# Patient Record
Sex: MALE | Race: WHITE | NOT HISPANIC OR LATINO | Employment: FULL TIME | URBAN - METROPOLITAN AREA
[De-identification: names, ages, dates, MRNs, and addresses within clinical notes are randomized per-mention and may not be internally consistent; named-entity substitution may affect disease eponyms.]

---

## 2017-12-20 ENCOUNTER — HOSPITAL ENCOUNTER (OUTPATIENT)
Dept: PREADMISSION TESTING | Facility: OTHER | Age: 57
Discharge: HOME | End: 2017-12-20
Attending: ORTHOPAEDIC SURGERY
Payer: COMMERCIAL

## 2017-12-20 ENCOUNTER — ANESTHESIA EVENT (OUTPATIENT)
Dept: SURGERY | Facility: OTHER | Age: 57
DRG: 470 | End: 2017-12-20
Payer: COMMERCIAL

## 2017-12-20 VITALS
HEART RATE: 85 BPM | HEIGHT: 68 IN | SYSTOLIC BLOOD PRESSURE: 170 MMHG | BODY MASS INDEX: 28.79 KG/M2 | RESPIRATION RATE: 16 BRPM | DIASTOLIC BLOOD PRESSURE: 90 MMHG | OXYGEN SATURATION: 98 % | WEIGHT: 190 LBS | TEMPERATURE: 99 F

## 2017-12-20 DIAGNOSIS — Z01.818 PREOPERATIVE TESTING: ICD-10-CM

## 2017-12-20 DIAGNOSIS — M17.12 PRIMARY OSTEOARTHRITIS OF LEFT KNEE: Primary | ICD-10-CM

## 2017-12-20 LAB
ANION GAP SERPL CALC-SCNC: 9 MMOL/L
BILIRUB UR QL STRIP: NEGATIVE
BUN SERPL-MCNC: 18 MG/DL
CALCIUM SERPL-MCNC: 9.8 MG/DL
CHLORIDE SERPL-SCNC: 105 MMOL/L
CLARITY UR: CLEAR
CO2 SERPL-SCNC: 27 MMOL/L
COLOR UR: YELLOW
CREAT SERPL-MCNC: 0.9 MG/DL
EST. GFR  (AFRICAN AMERICAN): >60 ML/MIN/1.73 M^2
EST. GFR  (NON AFRICAN AMERICAN): >60 ML/MIN/1.73 M^2
GLUCOSE SERPL-MCNC: 89 MG/DL
GLUCOSE UR QL STRIP: NEGATIVE
HCT VFR BLD AUTO: 44.8 %
HGB BLD-MCNC: 15.1 G/DL
HGB UR QL STRIP: NEGATIVE
KETONES UR QL STRIP: NEGATIVE
LEUKOCYTE ESTERASE UR QL STRIP: NEGATIVE
NITRITE UR QL STRIP: NEGATIVE
PH UR STRIP: 7 [PH] (ref 5–8)
POTASSIUM SERPL-SCNC: 4.7 MMOL/L
PROT UR QL STRIP: NEGATIVE
SODIUM SERPL-SCNC: 141 MMOL/L
SP GR UR STRIP: 1.01 (ref 1–1.03)
URN SPEC COLLECT METH UR: NORMAL
UROBILINOGEN UR STRIP-ACNC: NEGATIVE EU/DL

## 2017-12-20 PROCEDURE — 86850 RBC ANTIBODY SCREEN: CPT

## 2017-12-20 PROCEDURE — 81003 URINALYSIS AUTO W/O SCOPE: CPT

## 2017-12-20 PROCEDURE — 36415 COLL VENOUS BLD VENIPUNCTURE: CPT

## 2017-12-20 PROCEDURE — 86900 BLOOD TYPING SEROLOGIC ABO: CPT

## 2017-12-20 PROCEDURE — 85018 HEMOGLOBIN: CPT

## 2017-12-20 PROCEDURE — 93010 ELECTROCARDIOGRAM REPORT: CPT | Mod: ,,, | Performed by: INTERNAL MEDICINE

## 2017-12-20 PROCEDURE — 80048 BASIC METABOLIC PNL TOTAL CA: CPT

## 2017-12-20 PROCEDURE — 85014 HEMATOCRIT: CPT

## 2017-12-20 PROCEDURE — 93005 ELECTROCARDIOGRAM TRACING: CPT

## 2017-12-20 RX ORDER — FAMOTIDINE 20 MG/1
20 TABLET, FILM COATED ORAL
Status: CANCELLED | OUTPATIENT
Start: 2017-12-20 | End: 2017-12-20

## 2017-12-20 RX ORDER — MIDAZOLAM HYDROCHLORIDE 5 MG/ML
4 INJECTION INTRAMUSCULAR; INTRAVENOUS
Status: CANCELLED | OUTPATIENT
Start: 2017-12-20

## 2017-12-20 RX ORDER — OXYCODONE HYDROCHLORIDE 5 MG/1
5 TABLET ORAL ONCE AS NEEDED
Status: CANCELLED | OUTPATIENT
Start: 2017-12-20 | End: 2017-12-20

## 2017-12-20 RX ORDER — HYDROCODONE BITARTRATE AND ACETAMINOPHEN 10; 325 MG/1; MG/1
TABLET ORAL EVERY 6 HOURS PRN
COMMUNITY

## 2017-12-20 RX ORDER — DOXEPIN HYDROCHLORIDE 10 MG/ML
SOLUTION ORAL NIGHTLY
COMMUNITY

## 2017-12-20 RX ORDER — CARISOPRODOL 350 MG/1
350 TABLET ORAL 4 TIMES DAILY PRN
COMMUNITY

## 2017-12-20 RX ORDER — SODIUM CHLORIDE, SODIUM LACTATE, POTASSIUM CHLORIDE, CALCIUM CHLORIDE 600; 310; 30; 20 MG/100ML; MG/100ML; MG/100ML; MG/100ML
INJECTION, SOLUTION INTRAVENOUS CONTINUOUS
Status: CANCELLED | OUTPATIENT
Start: 2017-12-20

## 2017-12-20 RX ORDER — HYDROCODONE BITARTRATE 40 MG/1
40 TABLET, EXTENDED RELEASE ORAL
COMMUNITY

## 2017-12-20 RX ORDER — LIDOCAINE HYDROCHLORIDE 10 MG/ML
1 INJECTION, SOLUTION EPIDURAL; INFILTRATION; INTRACAUDAL; PERINEURAL ONCE
Status: CANCELLED | OUTPATIENT
Start: 2017-12-20 | End: 2017-12-20

## 2017-12-20 NOTE — DISCHARGE INSTRUCTIONS
PRE-ADMIT TESTING -  [phone]    [address]  [facility]          Your surgery has been scheduled at [facility]. We are pleased to have the opportunity to serve you. For Further Information please call [phone].    On the day of surgery please report to the Information Desk on the 1st floor.    · CONTACT YOUR PHYSICIAN'S OFFICE THE DAY PRIOR TO YOUR SURGERY TO OBTAIN YOUR ARRIVAL TIME.     · The evening before surgery do not eat anything after 9 p.m. ( this includes hard candy, chewing gum and mints).  You may only have GATORADE, POWERADE AND WATER  from 9 p.m. until you leave your home.   DO NOT DRINK ANY LIQUIDS ON THE WAY TO THE HOSPITAL.      SPECIAL MEDICATION INSTRUCTIONS: TAKE medications checked off by the Anesthesiologist on your Medication List.    Angiogram Patients: Take medications as instructed by your physician, including aspirin.     Surgery Patients:    If you take ASPIRIN - Your PHYSICIAN/SURGEON will need to inform you IF/OR when you need to stop taking aspirin prior to your surgery.     Do Not take any medications containing IBUPROFEN.  Do Not Wear any make-up or dark nail polish   (especially eye make-up) to surgery. If you come to surgery with makeup on you will be required to remove the makeup or nail polish.    Do not shave your surgical area at least 5 days prior to your surgery. The surgical prep will be performed at the hospital according to Infection Control regulations.    Leave all valuables at home.   Do Not wear any jewelry or watches, including any metal in body piercings.  Contact Lens must be removed before surgery. Either do not wear the contact lens or bring a case and solution for storage.  Please bring a container for eyeglasses or dentures as required.  Bring any paperwork your physician has provided, such as consent forms,  history and physicals, doctor's orders, etc.   Bring comfortable clothes that are loose fitting to wear upon  discharge. Take into consideration the type of surgery being performed.  Maintain your diet as advised per your physician the day prior to surgery.      Adequate rest the night before surgery is advised.   Park in the Parking lot behind the hospital or in the [facility] across the street from the parking lot. Parking is complimentary.  If you will be discharged the same day as your procedure, please arrange for a responsible adult to drive you home or to accompany you if traveling by taxi.   YOU WILL NOT BE PERMITTED TO DRIVE OR TO LEAVE THE HOSPITAL ALONE AFTER SURGERY.   It is strongly recommended that you arrange for someone to remain with you for the first 24 hrs following your surgery.       Thank you for your cooperation.  The Staff of [facility].        Bathing Instructions                                                                 Please shower the evening before and morning of your procedure with    ANTIBACTERIAL SOAP. ( DIAL, etc )  Concentrate on the surgical area   for at least 3 minutes and rinse completely. Dry off as usual.   Do not use any deodorant, powder, body lotions, perfume, after shave or    cologne.

## 2017-12-20 NOTE — ANESTHESIA PREPROCEDURE EVALUATION
12/20/2017  Cesar Lomeli is a 57 y.o., male.    Anesthesia Evaluation    I have reviewed the Patient Summary Reports.    I have reviewed the Nursing Notes.   I have reviewed the Medications.     Review of Systems  Anesthesia Hx:  No problems with previous Anesthesia    Social:  Non-Smoker, Social Alcohol Use    Hematology/Oncology:  Hematology Normal   Oncology Normal     EENT/Dental:EENT/Dental Normal   Cardiovascular:  Cardiovascular Normal Exercise tolerance: good     Pulmonary:  Pulmonary Normal    Renal/:  Renal/ Normal     Hepatic/GI:  Hepatic/GI Normal    Musculoskeletal:  Spine Disorders: cervical    Neurological:  Neurology Normal    Endocrine:  Endocrine Normal    Dermatological:  Skin Normal    Psych:  Psychiatric Normal           Physical Exam  General:  Well nourished, Obesity    Airway/Jaw/Neck:  Airway Findings: Mouth Opening: Normal Tongue: Normal  General Airway Assessment: Adult, Average  Mallampati: II  TM Distance: Normal, at least 6 cm  Jaw/Neck Findings:  Neck ROM: Normal ROM      Dental:  Dental Findings: In tact, molar caps        Mental Status:  Mental Status Findings:  Cooperative, Alert and Oriented         Anesthesia Plan  Type of Anesthesia, risks & benefits discussed:  Anesthesia Type:  spinal  Patient's Preference:   Intra-op Monitoring Plan: standard ASA monitors  Intra-op Monitoring Plan Comments:   Post Op Pain Control Plan:   Post Op Pain Control Plan Comments:   Induction:    Beta Blocker:         Informed Consent: Patient understands risks and agrees with Anesthesia plan.  Questions answered. Anesthesia consent signed with patient.  ASA Score: 2     Day of Surgery Review of History & Physical:    H&P update referred to the surgeon.     Anesthesia Plan Notes: Labs and EKG.        Ready For Surgery From Anesthesia Perspective.

## 2017-12-21 LAB
ABO + RH BLD: NORMAL
BLD GP AB SCN CELLS X3 SERPL QL: NORMAL

## 2017-12-26 ENCOUNTER — ANESTHESIA (OUTPATIENT)
Dept: SURGERY | Facility: OTHER | Age: 57
DRG: 470 | End: 2017-12-26
Payer: COMMERCIAL

## 2017-12-26 ENCOUNTER — HOSPITAL ENCOUNTER (INPATIENT)
Facility: OTHER | Age: 57
LOS: 1 days | Discharge: HOME | DRG: 470 | End: 2017-12-27
Attending: ORTHOPAEDIC SURGERY | Admitting: ORTHOPAEDIC SURGERY
Payer: COMMERCIAL

## 2017-12-26 DIAGNOSIS — M17.12 PRIMARY OSTEOARTHRITIS OF LEFT KNEE: Primary | ICD-10-CM

## 2017-12-26 DIAGNOSIS — M17.12 PRIMARY LOCALIZED OSTEOARTHROSIS OF THE KNEE, LEFT: ICD-10-CM

## 2017-12-26 PROCEDURE — 27201423 OPTIME MED/SURG SUP & DEVICES STERILE SUPPLY: Performed by: ORTHOPAEDIC SURGERY

## 2017-12-26 PROCEDURE — 63600175 PHARM REV CODE 636 W HCPCS: Performed by: NURSE ANESTHETIST, CERTIFIED REGISTERED

## 2017-12-26 PROCEDURE — 71000033 HC RECOVERY, INTIAL HOUR: Performed by: ORTHOPAEDIC SURGERY

## 2017-12-26 PROCEDURE — 63600175 PHARM REV CODE 636 W HCPCS: Performed by: SPECIALIST

## 2017-12-26 PROCEDURE — 25000003 PHARM REV CODE 250: Performed by: ANESTHESIOLOGY

## 2017-12-26 PROCEDURE — 36000710: Performed by: ORTHOPAEDIC SURGERY

## 2017-12-26 PROCEDURE — 97161 PT EVAL LOW COMPLEX 20 MIN: CPT

## 2017-12-26 PROCEDURE — 25000003 PHARM REV CODE 250: Performed by: SPECIALIST

## 2017-12-26 PROCEDURE — 63600175 PHARM REV CODE 636 W HCPCS: Performed by: ORTHOPAEDIC SURGERY

## 2017-12-26 PROCEDURE — 97530 THERAPEUTIC ACTIVITIES: CPT

## 2017-12-26 PROCEDURE — 25000003 PHARM REV CODE 250: Performed by: NURSE ANESTHETIST, CERTIFIED REGISTERED

## 2017-12-26 PROCEDURE — 63600175 PHARM REV CODE 636 W HCPCS: Performed by: ANESTHESIOLOGY

## 2017-12-26 PROCEDURE — 37000009 HC ANESTHESIA EA ADD 15 MINS: Performed by: ORTHOPAEDIC SURGERY

## 2017-12-26 PROCEDURE — 97116 GAIT TRAINING THERAPY: CPT

## 2017-12-26 PROCEDURE — C1776 JOINT DEVICE (IMPLANTABLE): HCPCS | Performed by: ORTHOPAEDIC SURGERY

## 2017-12-26 PROCEDURE — 71000039 HC RECOVERY, EACH ADD'L HOUR: Performed by: ORTHOPAEDIC SURGERY

## 2017-12-26 PROCEDURE — 94799 UNLISTED PULMONARY SVC/PX: CPT

## 2017-12-26 PROCEDURE — 11000001 HC ACUTE MED/SURG PRIVATE ROOM

## 2017-12-26 PROCEDURE — 37000008 HC ANESTHESIA 1ST 15 MINUTES: Performed by: ORTHOPAEDIC SURGERY

## 2017-12-26 PROCEDURE — 63600175 PHARM REV CODE 636 W HCPCS

## 2017-12-26 PROCEDURE — 25000003 PHARM REV CODE 250: Performed by: ORTHOPAEDIC SURGERY

## 2017-12-26 PROCEDURE — 36000711: Performed by: ORTHOPAEDIC SURGERY

## 2017-12-26 PROCEDURE — C1713 ANCHOR/SCREW BN/BN,TIS/BN: HCPCS | Performed by: ORTHOPAEDIC SURGERY

## 2017-12-26 PROCEDURE — 0SRD0J9 REPLACEMENT OF LEFT KNEE JOINT WITH SYNTHETIC SUBSTITUTE, CEMENTED, OPEN APPROACH: ICD-10-PCS | Performed by: ORTHOPAEDIC SURGERY

## 2017-12-26 PROCEDURE — 99900035 HC TECH TIME PER 15 MIN (STAT)

## 2017-12-26 PROCEDURE — C9290 INJ, BUPIVACAINE LIPOSOME: HCPCS | Performed by: ORTHOPAEDIC SURGERY

## 2017-12-26 PROCEDURE — 27000221 HC OXYGEN, UP TO 24 HOURS

## 2017-12-26 DEVICE — CEMENT BONE RDPQ 40G PDR 20ML: Type: IMPLANTABLE DEVICE | Site: KNEE | Status: FUNCTIONAL

## 2017-12-26 RX ORDER — PHENYLEPHRINE HYDROCHLORIDE 10 MG/ML
INJECTION INTRAVENOUS
Status: DISCONTINUED | OUTPATIENT
Start: 2017-12-26 | End: 2017-12-26

## 2017-12-26 RX ORDER — ONDANSETRON 2 MG/ML
4 INJECTION INTRAMUSCULAR; INTRAVENOUS DAILY PRN
Status: DISCONTINUED | OUTPATIENT
Start: 2017-12-26 | End: 2017-12-26 | Stop reason: HOSPADM

## 2017-12-26 RX ORDER — FAMOTIDINE 20 MG/1
20 TABLET, FILM COATED ORAL
Status: COMPLETED | OUTPATIENT
Start: 2017-12-26 | End: 2017-12-26

## 2017-12-26 RX ORDER — OXYCODONE HYDROCHLORIDE 5 MG/1
10 TABLET ORAL EVERY 4 HOURS PRN
Status: DISCONTINUED | OUTPATIENT
Start: 2017-12-26 | End: 2017-12-27 | Stop reason: HOSPADM

## 2017-12-26 RX ORDER — OXYCODONE HYDROCHLORIDE 5 MG/1
5 TABLET ORAL
Status: DISCONTINUED | OUTPATIENT
Start: 2017-12-26 | End: 2017-12-26 | Stop reason: HOSPADM

## 2017-12-26 RX ORDER — ENOXAPARIN SODIUM 100 MG/ML
40 INJECTION SUBCUTANEOUS
Status: DISCONTINUED | OUTPATIENT
Start: 2017-12-27 | End: 2017-12-27 | Stop reason: HOSPADM

## 2017-12-26 RX ORDER — ONDANSETRON 2 MG/ML
4 INJECTION INTRAMUSCULAR; INTRAVENOUS EVERY 12 HOURS PRN
Status: DISCONTINUED | OUTPATIENT
Start: 2017-12-26 | End: 2017-12-27 | Stop reason: HOSPADM

## 2017-12-26 RX ORDER — MEPERIDINE HYDROCHLORIDE 50 MG/ML
12.5 INJECTION INTRAMUSCULAR; INTRAVENOUS; SUBCUTANEOUS ONCE AS NEEDED
Status: DISCONTINUED | OUTPATIENT
Start: 2017-12-26 | End: 2017-12-26 | Stop reason: HOSPADM

## 2017-12-26 RX ORDER — PROPOFOL 10 MG/ML
VIAL (ML) INTRAVENOUS
Status: DISCONTINUED | OUTPATIENT
Start: 2017-12-26 | End: 2017-12-26

## 2017-12-26 RX ORDER — HYDROMORPHONE HYDROCHLORIDE 2 MG/ML
0.4 INJECTION, SOLUTION INTRAMUSCULAR; INTRAVENOUS; SUBCUTANEOUS EVERY 5 MIN PRN
Status: COMPLETED | OUTPATIENT
Start: 2017-12-26 | End: 2017-12-26

## 2017-12-26 RX ORDER — FENTANYL CITRATE 50 UG/ML
25 INJECTION, SOLUTION INTRAMUSCULAR; INTRAVENOUS EVERY 5 MIN PRN
Status: COMPLETED | OUTPATIENT
Start: 2017-12-26 | End: 2017-12-26

## 2017-12-26 RX ORDER — MIDAZOLAM HYDROCHLORIDE 5 MG/ML
4 INJECTION INTRAMUSCULAR; INTRAVENOUS
Status: DISCONTINUED | OUTPATIENT
Start: 2017-12-26 | End: 2017-12-26 | Stop reason: HOSPADM

## 2017-12-26 RX ORDER — OXYCODONE HYDROCHLORIDE 5 MG/1
5 TABLET ORAL EVERY 4 HOURS PRN
Status: DISCONTINUED | OUTPATIENT
Start: 2017-12-26 | End: 2017-12-27 | Stop reason: HOSPADM

## 2017-12-26 RX ORDER — MIDAZOLAM HYDROCHLORIDE 1 MG/ML
INJECTION INTRAMUSCULAR; INTRAVENOUS
Status: DISCONTINUED | OUTPATIENT
Start: 2017-12-26 | End: 2017-12-26

## 2017-12-26 RX ORDER — HYDROMORPHONE HYDROCHLORIDE 1 MG/ML
1 INJECTION, SOLUTION INTRAMUSCULAR; INTRAVENOUS; SUBCUTANEOUS EVERY 4 HOURS PRN
Status: DISCONTINUED | OUTPATIENT
Start: 2017-12-26 | End: 2017-12-27 | Stop reason: HOSPADM

## 2017-12-26 RX ORDER — BUPIVACAINE HYDROCHLORIDE AND EPINEPHRINE 5; 5 MG/ML; UG/ML
INJECTION, SOLUTION EPIDURAL; INTRACAUDAL; PERINEURAL
Status: DISCONTINUED | OUTPATIENT
Start: 2017-12-26 | End: 2017-12-26 | Stop reason: HOSPADM

## 2017-12-26 RX ORDER — LIDOCAINE HCL/PF 100 MG/5ML
SYRINGE (ML) INTRAVENOUS
Status: DISCONTINUED | OUTPATIENT
Start: 2017-12-26 | End: 2017-12-26

## 2017-12-26 RX ORDER — SODIUM CHLORIDE 0.9 % (FLUSH) 0.9 %
3 SYRINGE (ML) INJECTION
Status: DISCONTINUED | OUTPATIENT
Start: 2017-12-26 | End: 2017-12-26

## 2017-12-26 RX ORDER — ACETAMINOPHEN 10 MG/ML
INJECTION, SOLUTION INTRAVENOUS
Status: DISCONTINUED | OUTPATIENT
Start: 2017-12-26 | End: 2017-12-26

## 2017-12-26 RX ORDER — ROPIVACAINE HYDROCHLORIDE 5 MG/ML
INJECTION, SOLUTION EPIDURAL; INFILTRATION; PERINEURAL
Status: DISCONTINUED | OUTPATIENT
Start: 2017-12-26 | End: 2017-12-26

## 2017-12-26 RX ORDER — OXYCODONE HYDROCHLORIDE 5 MG/1
5 TABLET ORAL ONCE AS NEEDED
Status: DISCONTINUED | OUTPATIENT
Start: 2017-12-26 | End: 2017-12-26 | Stop reason: SDUPTHER

## 2017-12-26 RX ORDER — SODIUM CHLORIDE 0.9 % (FLUSH) 0.9 %
5 SYRINGE (ML) INJECTION
Status: DISCONTINUED | OUTPATIENT
Start: 2017-12-26 | End: 2017-12-27 | Stop reason: HOSPADM

## 2017-12-26 RX ORDER — PROPOFOL 10 MG/ML
VIAL (ML) INTRAVENOUS CONTINUOUS PRN
Status: DISCONTINUED | OUTPATIENT
Start: 2017-12-26 | End: 2017-12-26

## 2017-12-26 RX ORDER — KETOROLAC TROMETHAMINE 30 MG/ML
INJECTION, SOLUTION INTRAMUSCULAR; INTRAVENOUS
Status: DISCONTINUED | OUTPATIENT
Start: 2017-12-26 | End: 2017-12-26 | Stop reason: HOSPADM

## 2017-12-26 RX ORDER — MUPIROCIN 20 MG/G
1 OINTMENT TOPICAL 2 TIMES DAILY
Status: DISCONTINUED | OUTPATIENT
Start: 2017-12-26 | End: 2017-12-27 | Stop reason: HOSPADM

## 2017-12-26 RX ORDER — BACITRACIN 50000 [IU]/1
INJECTION, POWDER, FOR SOLUTION INTRAMUSCULAR
Status: DISCONTINUED | OUTPATIENT
Start: 2017-12-26 | End: 2017-12-26 | Stop reason: HOSPADM

## 2017-12-26 RX ORDER — FAMOTIDINE 20 MG/1
20 TABLET, FILM COATED ORAL 2 TIMES DAILY
Status: DISCONTINUED | OUTPATIENT
Start: 2017-12-26 | End: 2017-12-27 | Stop reason: HOSPADM

## 2017-12-26 RX ORDER — ZOLPIDEM TARTRATE 5 MG/1
5 TABLET ORAL NIGHTLY PRN
Status: DISCONTINUED | OUTPATIENT
Start: 2017-12-26 | End: 2017-12-27 | Stop reason: HOSPADM

## 2017-12-26 RX ORDER — CEFAZOLIN SODIUM 2 G/50ML
2 SOLUTION INTRAVENOUS
Status: DISCONTINUED | OUTPATIENT
Start: 2017-12-26 | End: 2017-12-27 | Stop reason: HOSPADM

## 2017-12-26 RX ORDER — LIDOCAINE HYDROCHLORIDE 10 MG/ML
1 INJECTION, SOLUTION EPIDURAL; INFILTRATION; INTRACAUDAL; PERINEURAL ONCE
Status: DISCONTINUED | OUTPATIENT
Start: 2017-12-26 | End: 2017-12-26 | Stop reason: HOSPADM

## 2017-12-26 RX ORDER — SODIUM CHLORIDE, SODIUM LACTATE, POTASSIUM CHLORIDE, CALCIUM CHLORIDE 600; 310; 30; 20 MG/100ML; MG/100ML; MG/100ML; MG/100ML
INJECTION, SOLUTION INTRAVENOUS CONTINUOUS
Status: DISCONTINUED | OUTPATIENT
Start: 2017-12-26 | End: 2017-12-27 | Stop reason: HOSPADM

## 2017-12-26 RX ORDER — SODIUM CHLORIDE, SODIUM LACTATE, POTASSIUM CHLORIDE, CALCIUM CHLORIDE 600; 310; 30; 20 MG/100ML; MG/100ML; MG/100ML; MG/100ML
INJECTION, SOLUTION INTRAVENOUS CONTINUOUS
Status: DISCONTINUED | OUTPATIENT
Start: 2017-12-26 | End: 2017-12-26

## 2017-12-26 RX ORDER — DOCUSATE SODIUM 100 MG/1
100 CAPSULE, LIQUID FILLED ORAL EVERY 12 HOURS
Status: DISCONTINUED | OUTPATIENT
Start: 2017-12-26 | End: 2017-12-27 | Stop reason: HOSPADM

## 2017-12-26 RX ORDER — TRANEXAMIC ACID 100 MG/ML
INJECTION, SOLUTION INTRAVENOUS
Status: DISCONTINUED | OUTPATIENT
Start: 2017-12-26 | End: 2017-12-26 | Stop reason: HOSPADM

## 2017-12-26 RX ORDER — CEFAZOLIN SODIUM 2 G/50ML
2 SOLUTION INTRAVENOUS
Status: COMPLETED | OUTPATIENT
Start: 2017-12-26 | End: 2017-12-26

## 2017-12-26 RX ADMIN — ONDANSETRON 4 MG: 2 INJECTION INTRAMUSCULAR; INTRAVENOUS at 08:12

## 2017-12-26 RX ADMIN — PROPOFOL 10 MG: 10 INJECTION, EMULSION INTRAVENOUS at 09:12

## 2017-12-26 RX ADMIN — HYDROMORPHONE HYDROCHLORIDE 0.4 MG: 2 INJECTION INTRAMUSCULAR; INTRAVENOUS; SUBCUTANEOUS at 01:12

## 2017-12-26 RX ADMIN — FAMOTIDINE 20 MG: 20 TABLET, FILM COATED ORAL at 08:12

## 2017-12-26 RX ADMIN — HYDROMORPHONE HYDROCHLORIDE 0.4 MG: 2 INJECTION INTRAMUSCULAR; INTRAVENOUS; SUBCUTANEOUS at 12:12

## 2017-12-26 RX ADMIN — CEFAZOLIN SODIUM 2 G: 2 SOLUTION INTRAVENOUS at 09:12

## 2017-12-26 RX ADMIN — HYDROMORPHONE HYDROCHLORIDE 0.4 MG: 2 INJECTION INTRAMUSCULAR; INTRAVENOUS; SUBCUTANEOUS at 03:12

## 2017-12-26 RX ADMIN — EPHEDRINE SULFATE 10 MG: 50 INJECTION INTRAMUSCULAR; INTRAVENOUS; SUBCUTANEOUS at 10:12

## 2017-12-26 RX ADMIN — ZOLPIDEM TARTRATE 5 MG: 5 TABLET, FILM COATED ORAL at 10:12

## 2017-12-26 RX ADMIN — SODIUM CHLORIDE, SODIUM LACTATE, POTASSIUM CHLORIDE, AND CALCIUM CHLORIDE: 600; 310; 30; 20 INJECTION, SOLUTION INTRAVENOUS at 10:12

## 2017-12-26 RX ADMIN — PHENYLEPHRINE HYDROCHLORIDE 100 MCG: 10 INJECTION INTRAVENOUS at 10:12

## 2017-12-26 RX ADMIN — PROPOFOL 10 MG: 10 INJECTION, EMULSION INTRAVENOUS at 11:12

## 2017-12-26 RX ADMIN — FAMOTIDINE 20 MG: 20 TABLET, FILM COATED ORAL at 07:12

## 2017-12-26 RX ADMIN — MIDAZOLAM HYDROCHLORIDE 2 MG: 1 INJECTION, SOLUTION INTRAMUSCULAR; INTRAVENOUS at 10:12

## 2017-12-26 RX ADMIN — OXYCODONE HYDROCHLORIDE 5 MG: 5 TABLET ORAL at 12:12

## 2017-12-26 RX ADMIN — DOCUSATE SODIUM 100 MG: 100 CAPSULE, LIQUID FILLED ORAL at 08:12

## 2017-12-26 RX ADMIN — ROPIVACAINE HYDROCHLORIDE 3 ML: 5 INJECTION, SOLUTION EPIDURAL; INFILTRATION; PERINEURAL at 09:12

## 2017-12-26 RX ADMIN — OXYCODONE HYDROCHLORIDE 10 MG: 5 TABLET ORAL at 10:12

## 2017-12-26 RX ADMIN — FENTANYL CITRATE 25 MCG: 50 INJECTION INTRAMUSCULAR; INTRAVENOUS at 12:12

## 2017-12-26 RX ADMIN — MUPIROCIN 1 G: 20 OINTMENT TOPICAL at 08:12

## 2017-12-26 RX ADMIN — IBUPROFEN 800 MG: 800 INJECTION INTRAVENOUS at 02:12

## 2017-12-26 RX ADMIN — CEFAZOLIN SODIUM 2 G: 2 SOLUTION INTRAVENOUS at 08:12

## 2017-12-26 RX ADMIN — PROPOFOL 10 MG: 10 INJECTION, EMULSION INTRAVENOUS at 10:12

## 2017-12-26 RX ADMIN — PHENYLEPHRINE HYDROCHLORIDE 200 MCG: 10 INJECTION INTRAVENOUS at 10:12

## 2017-12-26 RX ADMIN — OXYCODONE HYDROCHLORIDE 10 MG: 5 TABLET ORAL at 06:12

## 2017-12-26 RX ADMIN — ACETAMINOPHEN 1000 MG: 10 INJECTION, SOLUTION INTRAVENOUS at 10:12

## 2017-12-26 RX ADMIN — HYDROMORPHONE HYDROCHLORIDE 0.4 MG: 2 INJECTION INTRAMUSCULAR; INTRAVENOUS; SUBCUTANEOUS at 02:12

## 2017-12-26 RX ADMIN — MIDAZOLAM HYDROCHLORIDE 2 MG: 1 INJECTION, SOLUTION INTRAMUSCULAR; INTRAVENOUS at 09:12

## 2017-12-26 RX ADMIN — PROPOFOL 50 MCG/KG/MIN: 10 INJECTION, EMULSION INTRAVENOUS at 09:12

## 2017-12-26 RX ADMIN — SODIUM CHLORIDE, SODIUM LACTATE, POTASSIUM CHLORIDE, AND CALCIUM CHLORIDE: 600; 310; 30; 20 INJECTION, SOLUTION INTRAVENOUS at 04:12

## 2017-12-26 RX ADMIN — SODIUM CHLORIDE, SODIUM LACTATE, POTASSIUM CHLORIDE, AND CALCIUM CHLORIDE: 600; 310; 30; 20 INJECTION, SOLUTION INTRAVENOUS at 08:12

## 2017-12-26 RX ADMIN — EPHEDRINE SULFATE 10 MG: 50 INJECTION INTRAMUSCULAR; INTRAVENOUS; SUBCUTANEOUS at 11:12

## 2017-12-26 RX ADMIN — IBUPROFEN 800 MG: 800 INJECTION INTRAVENOUS at 09:12

## 2017-12-26 RX ADMIN — LIDOCAINE HYDROCHLORIDE 100 MG: 20 INJECTION, SOLUTION INTRAVENOUS at 11:12

## 2017-12-26 RX ADMIN — MIDAZOLAM HYDROCHLORIDE 4 MG: 5 INJECTION, SOLUTION INTRAMUSCULAR; INTRAVENOUS at 08:12

## 2017-12-26 NOTE — ANESTHESIA POSTPROCEDURE EVALUATION
"Anesthesia Post Evaluation    Patient: Cesar Lomeli    Procedure(s) Performed: Procedure(s) (LRB):  REPLACEMENT-KNEE-TOTAL (Left)    Final Anesthesia Type: spinal  Patient location during evaluation: PACU  Patient participation: Yes- Able to Participate  Level of consciousness: awake and alert  Post-procedure vital signs: reviewed and stable  Pain management: adequate  Airway patency: patent  PONV status at discharge: No PONV  Anesthetic complications: no      Cardiovascular status: hemodynamically stable  Respiratory status: room air and spontaneous ventilation  Hydration status: euvolemic  Follow-up not needed.        Visit Vitals  BP (!) 140/79   Pulse 67   Temp 36.4 °C (97.6 °F) (Oral)   Resp 18   Ht 5' 8" (1.727 m)   Wt 86.2 kg (190 lb)   SpO2 99%   BMI 28.89 kg/m²       Pain/Pau Score: Pain Assessment Performed: Yes (12/26/2017  3:30 PM)  Presence of Pain: complains of pain/discomfort (12/26/2017  3:30 PM)  Pain Rating Prior to Med Admin: 8 (12/26/2017  3:30 PM)  Pain Rating Post Med Admin: 8 (12/26/2017  3:30 PM)  Pau Score: 9 (12/26/2017  3:30 PM)      "

## 2017-12-26 NOTE — CONSULTS
"Consult Note  Internal Medicine    Consult Requested By: Ignacio Saeed MD  Reason for Consult: osteoarthritis, insomnia and chronic pain    SUBJECTIVE:     History of Present Illness:   57 y.o. male presents with scheduled left knee repair. Patient seen in PACU, paper chart and Epic reviewed. Denies CP,SOB, F or chills. Patient reports having multiple cervical fusions and having difficulty "getting comfortable to sleep". Followed by Dr. Alvarenga in [place]. Pt has had N/V after eating today.    Past Medical History:   Diagnosis Date    Arthritis     Cervical (neck) region somatic dysfunction      Past Surgical History:   Procedure Laterality Date    BACK SURGERY      cervical fusion x3    NAZARIO      KNEE ARTHROSCOPY      KNEE ARTHROSCOPY W/ ACL RECONSTRUCTION      SHOULDER ARTHROSCOPY W/ ROTATOR CUFF REPAIR      TONSILLECTOMY       History reviewed. No pertinent family history.  Social History   Substance Use Topics    Smoking status: Never Smoker    Smokeless tobacco: Never Used    Alcohol use Yes      Comment: SOCIALLY       Review of patient's allergies indicates:  No Known Allergies     Review of Systems:  Constitutional: No fever or chills, + difficulty sleeping  Respiratory: No cough or shortness of breath  Cardiovascular: No chest pain or palpitations  Gastrointestinal: No nausea or vomiting  Neurological: No confusion or weakness    OBJECTIVE:     Vital Signs (Most Recent)  Temp: 97.6 °F (36.4 °C) (12/26/17 1145)  Pulse: 65 (12/26/17 1230)  Resp: 18 (12/26/17 1230)  BP: (!) 140/66 (12/26/17 1230)  SpO2: 98 % (12/26/17 1230)    Vital Signs Range (Last 24H):  Temp:  [97.6 °F (36.4 °C)-98.3 °F (36.8 °C)]   Pulse:  [65-87]   Resp:  [18-20]   BP: (125-145)/(57-81)   SpO2:  [96 %-100 %]       Intake/Output Summary (Last 24 hours) at 12/26/17 1240  Last data filed at 12/26/17 1145   Gross per 24 hour   Intake             1500 ml   Output              250 ml   Net             1250 ml       Physical " "Exam:  General appearance: Well developed, well nourished  Eyes:  Conjunctivae/corneas clear. PERRL.  Lungs: Normal respiratory effort,   clear to auscultation bilaterally   Heart: Regular rate and rhythm, S1, S2 normal, no murmur, rub or emi.  Abdomen: Soft, non-tender non-distended; bowel sounds normal; no masses,  no organomegaly  Extremities: No cyanosis or clubbing. No edema.  ACE wrap to left knee, CDI. Polar care in place.  Skin: Skin color, texture, turgor normal. No rashes or lesions  Reyes.   Neurologic: Normal strength and tone. No focal numbness or weakness       Laboratory:    Reviewed    Diagnostic Results:      ASSESSMENT/PLAN:     1. Left knee repair (M17.12): per ortho and therapy teams  2. Insomnia (G47.00): secondary to chronic pain. Takes Doxepin at home, will not continue for now secondary to IV narcotic order.   3. Chronic pain (G89.29) secondary to cervical region somatic dysfunction (M99.01): managed by Dr. Alvarenga. Patient reports his last cervical fusion "failed".  4. DVT prophy: Lovenox 40 mg QD, TEDs and foot pumps per ortho  5. PONV: anti-emetics prn.    Plan: See above recommendations and orders. Will follow along.    "

## 2017-12-26 NOTE — PT/OT/SLP EVAL
Physical Therapy Evaluation    Patient Name:  Cesar Lomeli   MRN:  [id number]    Recommendations:     Discharge Recommendations:  home with home health, home health PT   Discharge Equipment Recommendations: walker, rolling (Pt has sa built-in seat in his shower and a raised toilet)   Barriers to discharge: Pt lives alone; his friend will be staying with him for at least one night    Assessment:     Cesar Lomeli is a 57 y.o. male admitted with a medical diagnosis of Primary localized osteoarthrosis of the knee, left.  He presents with the following impairments/functional limitations:  weakness, impaired self care skills, impaired functional mobilty, gait instability, pain, decreased ROM, edema, orthopedic precautions, impaired skin, decreased lower extremity function. PT evaluation completed. Good activity tolerance including gait in halls with walker > 200 ft. Will continue to follow and progress as tolerated.    Rehab Prognosis:  Good; patient would benefit from acute skilled PT services to address these deficits and reach maximum level of function.      Recent Surgery: Procedure(s) (LRB):  REPLACEMENT-KNEE-TOTAL (Left) Day of Surgery    Plan:     During this hospitalization, patient to be seen BID to address the above listed problems via gait training, therapeutic activities, therapeutic exercises, neuromuscular re-education  · Plan of Care Expires:  01/25/18   Plan of Care Reviewed with: patient    Subjective     Communicated with nurse prior to session.  Patient found supine in bed with with nurse and friends present upon PT entry to room, agreeable to evaluation.      Chief Complaint: pain  Patient comments/goals: pain relief, brush teeth  Pain/Comfort:  · Pain Rating 1: 8/10  · Location - Side 1: Left  · Location 1: knee  · Pain Addressed 1: Pre-medicate for activity, Cessation of Activity, Nurse notified  · Pain Rating Post-Intervention 1: 6/10    Patients cultural, spiritual, Quaker conflicts  "given the current situation: none specified    Living Environment:  Pt lives alone "[place] miles away" in 3 story house with elevator entrance as well as elevator access to second and third stories. Preferred bed/bath on 3rd floor with walk-in shower with built-in seat and comfort height toilet.   Prior to admission, patients level of function was independent with ambulation, ADLs, cooking, cleaning, driving, shopping.  Patient has the following equipment: none.  DME owned (not currently used): axillary crutches.  Upon discharge, patient will have assistance from a friend for at least one night.    Objective:     Patient found with: cryotherapy, FCD, tobar catheter, peripheral IV     General Precautions: Standard,  (fall risk)   Orthopedic Precautions:LLE weight bearing as tolerated   Braces: N/A     Exams:  · Cognition: Patient is oriented to Person, Place, Time and Situation and follows approximately 100% of one step commands.    · Posture:    · -       Rounded shoulders  · -       protective guarding L LE  · Sensation: Intact to light touch bilateral LEs where exposed. Denied paresthesias.   · Skin Integrity: L knee dressing clean and dry  · Edema: Moderate L LE  · Coordination: No coordination impairments identified with functional mobility. No formal testing performed.   · LE ROM/Strength: R LE WFL; L ankle dorsiflexion WFL, knee extension/flexion limited with pain and bulky dressing grossly 3-/5; hip WFL  · Tone: No tone impairments identified during functional mobility.   · Vital signs: SpO2: 98% on room air; Heart rate 68 bpm      Functional Mobility:  · Bed Mobility:     · Supine to Sit: stand by assistance and HOB elevated  · Transfers:     · Sit to Stand:  contact guard assistance with rolling walker, verbal cues for technique and safety  · Gait: x 230 ft on level tile with rolling walker, CGA, verbal cues for symmetrical step length and upright posture, second person chair follow  Balance: SBA for dynamic " stand including brushing teeth at sink approximately 1 minute    AM-PAC 6 CLICK MOBILITY  Total Score:18       Therapeutic Activities and Exercises:   Discussed PT plan of care, safety with OOB mobility, use of walker, transfer technique, WB status, avoiding pivoting on surgical extremity      Patient left reclined in chair with all lines intact, call button in reach, bed alarm on, nurse notified and friends present.    GOALS:    Physical Therapy Goals        Problem: Physical Therapy Goal    Goal Priority Disciplines Outcome Goal Variances Interventions   Physical Therapy Goal     PT/OT, PT Ongoing (interventions implemented as appropriate)     Description:  Goals to be met by: 17     Patient will increase functional independence with mobility by performin. Supine to sit with supervision.   2. Sit to supine with supervision.   3. Sit<>stand transfer with supervision using rolling walker.   4. Gait > 150 feet with SBA using rolling walker.   5. Ascend/descend curb step with CGA with rolling walker.                      History:     Past Medical History:   Diagnosis Date    Arthritis     Cervical (neck) region somatic dysfunction        Past Surgical History:   Procedure Laterality Date    BACK SURGERY      cervical fusion x3    NAZARIO      KNEE ARTHROSCOPY      KNEE ARTHROSCOPY W/ ACL RECONSTRUCTION      SHOULDER ARTHROSCOPY W/ ROTATOR CUFF REPAIR      TONSILLECTOMY         Clinical Decision Making:     History  Co-morbidities and personal factors that may impact the plan of care Examination  Body Structures and Functions, activity limitations and participation restrictions that may impact the plan of care Clinical Presentation   Decision Making/ Complexity Score   Co-morbidities:   [] Time since onset of injury / illness / exacerbation  [] Status of current condition  []Patient's cognitive status and safety concerns    [] Multiple Medical Problems (see med hx)  Personal Factors:   [] Patient's  age  [] Prior Level of function   [] Patient's home situation (environment and family support)  [] Patient's level of motivation  [] Expected progression of patient      HISTORY:(criteria)    [] 03323 - no personal factors/history    [] 04713 - has 1-2 personal factor/comorbidity     [] 64554 - has >3 personal factor/comorbidity     Body Regions:  [] Objective examination findings  [] Head     []  Neck  [] Trunk   [] Upper Extremity  [] Lower Extremity    Body Systems:  [] For communication ability, affect, cognition, language, and learning style: the assessment of the ability to make needs known, consciousness, orientation (person, place, and time), expected emotional /behavioral responses, and learning preferences (eg, learning barriers, education  needs)  [] For the neuromuscular system: a general assessment of gross coordinated movement (eg, balance, gait, locomotion, transfers, and transitions) and motor function  (motor control and motor learning)  [] For the musculoskeletal system: the assessment of gross symmetry, gross range of motion, gross strength, height, and weight  [] For the integumentary system: the assessment of pliability(texture), presence of scar formation, skin color, and skin integrity  [] For cardiovascular/pulmonary system: the assessment of heart rate, respiratory rate, blood pressure, and edema     Activity limitations:    [] Patient's cognitive status and saf ety concerns          [] Status of current condition      [] Weight bearing restriction  [] Cardiopulmunary Restriction    Participation Restrictions:   [] Goals and goal agreement with the patient     [] Rehab potential (prognosis) and probable outcome      Examination of Body System: (criteria)    [] 78408 - addressing 1-2 elements    [] 60791 - addressing a total of 3 or more elements     [] 32119 -  Addressing a total of 4 or more elements         Clinical Presentation: (criteria)  Choose one     On examination of body system  using standardized tests and measures patient presents with (CHOOSE ONE) elements from any of the following: body structures and functions, activity limitations, and/or participation restrictions.  Leading to a clinical presentation that is considered (CHOOSE ONE)                              Clinical Decision Making  (Eval Complexity):  Choose One     Time Tracking:     PT Received On: 12/26/17  PT Start Time: 1646     PT Stop Time: 1721  PT Total Time (min): 35 min     Billable Minutes: Evaluation 12, Gait Training 13 and Therapeutic Activity 10      Wendy Samayoa, PT  12/26/2017

## 2017-12-26 NOTE — PROGRESS NOTES
Pt states pain is 8 out of 10.   Pt FLACC score is 0.   Pt is sleeping/snoring and appears to be in comfortable.

## 2017-12-26 NOTE — ANESTHESIA PROCEDURE NOTES
Spinal    Diagnosis: Knee replacement  Patient location during procedure: holding area  Timeout: 12/26/2017 9:00 AM  Staffing  Anesthesiologist: OSEI CORTES  Performed: anesthesiologist   Preanesthetic Checklist  Completed: patient identified, site marked, surgical consent, pre-op evaluation, timeout performed, IV checked, risks and benefits discussed and monitors and equipment checked  Spinal Block  Patient position: sitting  Prep: ChloraPrep  Patient monitoring: heart rate, cardiac monitor and continuous pulse ox  Approach: midline  Location: L3-4  Injection technique: single shot  CSF Fluid: clear free-flowing CSF  Needle  Needle type: pencil-tip   Needle gauge: 22 G  Needle length: 3.5 in  Additional Documentation: incremental injection, negative aspiration for heme and no paresthesia on injection  Needle localization: anatomical landmarks  Assessment  Sensory level: T8   Dermatomal levels determined by pinch or prick  Ease of block: easy  Patient's tolerance of the procedure: comfortable throughout block and no complaints  Medications:  Bolus administered: 3 mL of 0.5 ropivacaine  Epinephrine added: none

## 2017-12-26 NOTE — BRIEF OP NOTE
[facility]  Brief Operative Note    SUMMARY     Surgery Date: 12/26/2017     Surgeon(s) and Role:     * Ignacio Saeed MD - Primary    Assisting Surgeon: None    Pre-op Diagnosis:  Primary localized osteoarthritis of left knee [M17.12]    Post-op Diagnosis:  Post-Op Diagnosis Codes:     * Primary localized osteoarthritis of left knee [M17.12]    Procedure(s) (LRB):  REPLACEMENT-KNEE-TOTAL (Left)    Anesthesia: Spinal    Description of Procedure: Left TKA    Description of the findings of the procedure: Left TKA  See op note # [id number]    Estimated Blood Loss: * No values recorded between 12/26/2017 10:06 AM and 12/26/2017 11:44 AM *         Specimens:   Specimen (12h ago through future)    None

## 2017-12-26 NOTE — PLAN OF CARE
Problem: Patient Care Overview  Goal: Plan of Care Review  Outcome: Ongoing (interventions implemented as appropriate)  Pt on NC 2L sat's 100%;post-op IS refused at this time.Will check on later.

## 2017-12-26 NOTE — CARE UPDATE
Report received from Tegan RIVAS from recovery Pt transferred to room 367. Pt AAO x 4. Resp. Even and unlabored. O2@2L/NC. Tolerating well O2 sats 100%. Dsg to Lt knee CDI. Polar ice in place. SCD foot pumps in place bilateral. LR started @ 100cc/hr to 18g to left wrist. VSS. Pt states pain to Left knee @ 8/10. Was just medicated recently with Dilaudid IV in pacu. TOlerating clear liquids well. Diet advanced to regular. Family @ bedside. Oriented to room and surroundings. Safety maintained. Call light in reach.

## 2017-12-26 NOTE — PLAN OF CARE
Met with patient & friends at bedside to complete discharge assessment. Patient is under the expression home health is already arranged by Dr Saeed with [facility]. Spoke to Meka at [facility] [phone] who reports they know of the patient because he has called several times but have not received any orders to initiate care. Will send referral via [facility] once orders available. Patient's physical address is [facility] [address] [place] [address]. Patient will need a rolling walker to DC home with. Permission given from Aide at [facility] to pull from our supply. Will deliver to patient's room prior to DC tomorrow      12/26/17 8452   Discharge Assessment   Assessment Type Discharge Planning Assessment   Confirmed/corrected address and phone number on facesheet? Yes   Assessment information obtained from? Patient   Expected Length of Stay (days) 1   Communicated expected length of stay with patient/caregiver yes   Prior to hospitilization cognitive status: Alert/Oriented   Prior to hospitalization functional status: Independent   Current cognitive status: Alert/Oriented   Current Functional Status: Needs Assistance;Assistive Equipment   Lives With alone   Able to Return to Prior Arrangements yes   Is patient able to care for self after discharge? Yes   Patient's perception of discharge disposition home health   Readmission Within The Last 30 Days no previous admission in last 30 days   Patient currently being followed by outpatient case management? No   Patient currently receives any other outside agency services? No   Equipment Currently Used at Home crutches, axillary;raised toilet;bath bench   Do you have any problems affording any of your prescribed medications? No   Is the patient taking medications as prescribed? yes   Does the patient have transportation home? Yes   Transportation Available family or friend will provide   Does the patient receive services at the [facility]? No    Discharge Plan A Home Health   Patient/Family In Agreement With Plan yes

## 2017-12-26 NOTE — TRANSFER OF CARE
"Anesthesia Transfer of Care Note    Patient: Cesar Lomeli    Procedure(s) Performed: Procedure(s) (LRB):  REPLACEMENT-KNEE-TOTAL (Left)    Patient location: PACU    Anesthesia Type: spinal    Transport from OR: Transported from OR on room air with adequate spontaneous ventilation    Post pain: adequate analgesia    Post assessment: no apparent anesthetic complications    Post vital signs: stable    Level of consciousness: awake    Nausea/Vomiting: no nausea/vomiting    Complications: none    Transfer of care protocol was followed      Last vitals:   Visit Vitals  /76 (BP Location: Left arm, Patient Position: Lying)   Pulse 66   Temp 36.8 °C (98.3 °F) (Oral)   Resp 20   Ht 5' 8" (1.727 m)   Wt 86.2 kg (190 lb)   SpO2 99%   BMI 28.89 kg/m²     "

## 2017-12-26 NOTE — PLAN OF CARE
Problem: Physical Therapy Goal  Goal: Physical Therapy Goal  Goals to be met by: 17     Patient will increase functional independence with mobility by performin. Supine to sit with supervision.   2. Sit to supine with supervision.   3. Sit<>stand transfer with supervision using rolling walker.   4. Gait > 150 feet with SBA using rolling walker.   5. Ascend/descend curb step with CGA with rolling walker.    Outcome: Ongoing (interventions implemented as appropriate)  PT evaluation completed. Good activity tolerance including gait in halls with walker > 200 ft. Will continue to follow and progress as tolerated. Please see progress note for detailed plan of care and recommendations (HH, RW).

## 2017-12-27 VITALS
DIASTOLIC BLOOD PRESSURE: 77 MMHG | WEIGHT: 192 LBS | TEMPERATURE: 98 F | RESPIRATION RATE: 18 BRPM | BODY MASS INDEX: 29.1 KG/M2 | SYSTOLIC BLOOD PRESSURE: 145 MMHG | OXYGEN SATURATION: 98 % | HEIGHT: 68 IN | HEART RATE: 78 BPM

## 2017-12-27 LAB
ERYTHROCYTE [DISTWIDTH] IN BLOOD BY AUTOMATED COUNT: 13.6 %
HCT VFR BLD AUTO: 36.5 %
HGB BLD-MCNC: 12.3 G/DL
MCH RBC QN AUTO: 31.6 PG
MCHC RBC AUTO-ENTMCNC: 33.7 G/DL
MCV RBC AUTO: 94 FL
PLATELET # BLD AUTO: 184 K/UL
PMV BLD AUTO: 9.6 FL
RBC # BLD AUTO: 3.89 M/UL
WBC # BLD AUTO: 5.62 K/UL

## 2017-12-27 PROCEDURE — 63600175 PHARM REV CODE 636 W HCPCS: Performed by: ORTHOPAEDIC SURGERY

## 2017-12-27 PROCEDURE — 85027 COMPLETE CBC AUTOMATED: CPT

## 2017-12-27 PROCEDURE — 36415 COLL VENOUS BLD VENIPUNCTURE: CPT

## 2017-12-27 PROCEDURE — 97116 GAIT TRAINING THERAPY: CPT

## 2017-12-27 PROCEDURE — 97165 OT EVAL LOW COMPLEX 30 MIN: CPT

## 2017-12-27 PROCEDURE — 97535 SELF CARE MNGMENT TRAINING: CPT

## 2017-12-27 PROCEDURE — 25000003 PHARM REV CODE 250: Performed by: ANESTHESIOLOGY

## 2017-12-27 PROCEDURE — 25000003 PHARM REV CODE 250: Performed by: ORTHOPAEDIC SURGERY

## 2017-12-27 PROCEDURE — 97110 THERAPEUTIC EXERCISES: CPT

## 2017-12-27 RX ORDER — ASPIRIN 81 MG/1
81 TABLET ORAL 2 TIMES DAILY
Qty: 28 TABLET | Refills: 0 | Status: SHIPPED | OUTPATIENT
Start: 2017-12-27 | End: 2018-01-10

## 2017-12-27 RX ADMIN — MUPIROCIN 1 G: 20 OINTMENT TOPICAL at 09:12

## 2017-12-27 RX ADMIN — OXYCODONE HYDROCHLORIDE 10 MG: 5 TABLET ORAL at 02:12

## 2017-12-27 RX ADMIN — IBUPROFEN 800 MG: 800 INJECTION INTRAVENOUS at 09:12

## 2017-12-27 RX ADMIN — IBUPROFEN 800 MG: 800 INJECTION INTRAVENOUS at 02:12

## 2017-12-27 RX ADMIN — HYDROMORPHONE HYDROCHLORIDE 1 MG: 1 INJECTION, SOLUTION INTRAMUSCULAR; INTRAVENOUS; SUBCUTANEOUS at 09:12

## 2017-12-27 RX ADMIN — DOCUSATE SODIUM 100 MG: 100 CAPSULE, LIQUID FILLED ORAL at 09:12

## 2017-12-27 RX ADMIN — CEFAZOLIN SODIUM 2 G: 2 SOLUTION INTRAVENOUS at 04:12

## 2017-12-27 RX ADMIN — ENOXAPARIN SODIUM 40 MG: 100 INJECTION SUBCUTANEOUS at 05:12

## 2017-12-27 RX ADMIN — FAMOTIDINE 20 MG: 20 TABLET, FILM COATED ORAL at 09:12

## 2017-12-27 RX ADMIN — OXYCODONE HYDROCHLORIDE 10 MG: 5 TABLET ORAL at 06:12

## 2017-12-27 RX ADMIN — OXYCODONE HYDROCHLORIDE 10 MG: 5 TABLET ORAL at 10:12

## 2017-12-27 NOTE — PROGRESS NOTES
Patient postoperative day #1 from left total knee arthroplasty.  Overall he seems to be doing well with minimal complaints.  Left knee dressing intact neurovascular status stable.  He did ambulate over 200 feet yesterday.  Hemoglobin 12 hematocrit 36.  Plan:Dressing change today and discharge this afternoon after p.m. Therapy.          Continue gait training and range of motion to left knee.          Home health arranged for gait training and range of motion.          Low-dose aspirin regimen discussed for DVT prophylaxis.

## 2017-12-27 NOTE — PT/OT/SLP EVAL
Occupational Therapy   Evaluation/Treatment/Discharge    Name: Cesar Lomeli  MRN: [id number]  Admitting Diagnosis:  Primary localized osteoarthrosis of the knee, left 1 Day Post-Op    Recommendations:     Discharge Recommendations:    Discharge Equipment Recommendations:  walker, rolling  Barriers to discharge:  Decreased caregiver support, Inaccessible home environment    History:     Occupational Profile:  Living Environment: lives alone in a single story house with 2 steps at entry  Previous level of function: ambulatory without AD, drives a care, Independent ADLs and IADLs  Roles and Routines: works full time as a , watches TV  Equipment Owned:  crutches, axillary (available for use)  Assistance upon Discharge: assist is limited, he has a friend assisting this evening and a neighbor he can as needed    Past Medical History:   Diagnosis Date    Arthritis     Cervical (neck) region somatic dysfunction        Past Surgical History:   Procedure Laterality Date    BACK SURGERY      cervical fusion x3    NAZARIO      KNEE ARTHROSCOPY      KNEE ARTHROSCOPY W/ ACL RECONSTRUCTION      SHOULDER ARTHROSCOPY W/ ROTATOR CUFF REPAIR      TONSILLECTOMY         Subjective     Chief Complaint: knee pain  Patient/Family stated goals: regain PLOF  The patient was found supine in bed, CPM running.  He was agreeable to OT service.  Pain/Comfort:  · Pain Rating 1: 8/10 (supine in bed)  · Location - Side 1: Left  · Location - Orientation 1: generalized  · Location 1: knee  · Pain Addressed 1: Pre-medicate for activity, Reposition, Nurse notified (NurseaLshawn, gave the patient pain medication at the beginning of the sesson)  · Pain Rating Post-Intervention 1: 7/10 (seated in chair)    Objective:     Patient found with: FCD, cryotherapy, CPM, peripheral IV    General Precautions: Standard, fall   Orthopedic Precautions:LLE weight bearing as tolerated   Braces: N/A     Occupational Performance:    Bed Mobility:     · Supervision supine>sit EOB    Functional Mobility/Transfers:  · SBA sit><stand with RW  · Min A toilet transfer (with instruction)  · SBA chair trans conor with RW    Activities of Daily Living:     Mi UBD seated EOB     Min A LBD (doff socks, don shoes and shorts) - instruction provided     SBA G/H (wash hands at sink) with RW     Declined toilet use    Cognitive/Visual Perceptual:  Cognitive/Psychosocial Skills:     -       Oriented to: Person, Place, Time and Situation   -       Follows Commands/attention:Follows two-step commands  -       Communication: clear/fluent  -       Memory: No Deficits noted  -       Safety awareness/insight to disability: impaired   -       Mood/Affect/Coping skills/emotional control: Anxious  Visual/Perceptual:      -Impaired  acuity (glasses)    Physical Exam:  Postural examination/scapula alignment:    -       No postural abnormalities identified  Skin integrity: Visible skin intact  Edema:  None noted (Left knee bandaged)  Sensation:    -       Intact for light touch both hands  Motor Planning:    -       WFL  Dominant hand:    -       Right  UE strength and ROM: WNL BUE  Hand Function: WNL both hands  Gross motor coordination:   sitting balance EOB WFL, impaired LE balance-gait, low endurance with activity, safety with RW fair    Patient left up in chair with all lines intact, call button in reach and nurse notified    AMPA 6 Click:  AMPAC Total Score: 20    Treatment & Education:  Toilet in standing with RW, carrying objects with RW, home safety and set-up for self-care, integrating LE exercise into ADLs, pain management post-TKA surgery, activity restrictions and recommendations, preporation for functioning with minimal assist with discharge home, transfer training with RW, safety with standing and ambulatory ADLs  Education:    Assessment:     Cesar Lomeli is a 57 y.o. male with a medical diagnosis of Primary localized osteoarthrosis of the knee, left<f s/p TKA.  He  "presents with Performance deficits affecting function are impaired endurance, impaired balance, impaired self care skills, impaired functional mobilty, pain, decreased lower extremity function, orthopedic precautions, decreased safety awareness (anxiety). Effected performance during the session.  OT treatment was provided to instruct in RW use, home safety and  Self-care with activity recommendations provided for home.  No further OT treatment planned, hospital discharge is planned for today.     Rehab Prognosis:  good; patient would benefit from Home Health skilled OT services to address these deficits and reach maximum level of function.         Clinical Decision Makin.  OT Low:  "Pt evaluation falls under low complexity for evaluation coding due to performance deficits noted in 1-3 areas as stated above and 0 co-morbities affecting current functional status. Data obtained from problem focused assessments. No modifications or assistance was required for completion of evaluation. Only brief occupational profile and history review completed."     Plan:     Patient to be seen   to address the above listed problems via    · Plan of Care Expires:    · Plan of Care Reviewed with: patient    This Plan of care has been discussed with the patient who was involved in its development and understands and is in agreement with the identified goals and treatment plan    GOALS:    Occupational Therapy Goals     Not on file          Multidisciplinary Problems (Resolved)        Problem: Occupational Therapy Goal    Goal Priority Disciplines Outcome Interventions   Occupational Therapy Goal   (Resolved)     OT, PT/OT Outcome(s) achieved    Description:  No goals                    Time Tracking:     OT Date of Treatment: 17  OT Start Time: 829  OT Stop Time: 921  OT Total Time (min): 52 min    Billable Minutes:Evaluation 26  Self Care/Home Management 28    RICKI Castrejon  2017    "

## 2017-12-27 NOTE — PT/OT/SLP PROGRESS
"Physical Therapy Treatment    Patient Name:  Cesar Lomeli   MRN:  [id number]    Recommendations:     Discharge Recommendations:  home health PT   Discharge Equipment Recommendations: walker, rolling   Barriers to discharge: None    Assessment:     Cesar Lomeli is a 57 y.o. male admitted with a medical diagnosis of Primary localized osteoarthrosis of the knee, left.  He presents with the following impairments/functional limitations:  weakness, impaired functional mobilty, decreased ROM, decreased safety awareness, decreased lower extremity function patient is progressing well towards goals. Patient with increase pain on this day. Patient required SPV level with functional mobility. Patient will cont. To benefit from skilled PT services to increase strength, endurance and functional mobility.     Rehab Prognosis:  good; patient would benefit from acute skilled PT services to address these deficits and reach maximum level of function.      Recent Surgery: Procedure(s) (LRB):  REPLACEMENT-KNEE-TOTAL (Left) 1 Day Post-Op    Plan:     During this hospitalization, patient to be seen BID to address the above listed problems via gait training, therapeutic activities, therapeutic exercises, neuromuscular re-education  · Plan of Care Expires:  01/25/18   Plan of Care Reviewed with: patient    Subjective     Communicated with nurse prior to session.  Patient found standing in room independently with rolling walker upon PT entry to room, agreeable to treatment.      Chief Complaint: patient stated " I'm ready to get out of here"   Patient comments/goals: home   Pain/Comfort:  · Pain Rating 1: 5/10  · Location - Side 1: Left  · Location - Orientation 1: generalized  · Location 1: knee  · Pain Addressed 1: Pre-medicate for activity, Cessation of Activity  · Pain Rating Post-Intervention 1: 3/10    Patients cultural, spiritual, [employer] conflicts given the current situation: none specified    Objective:     Patient found " "with: peripheral IV     General Precautions: Standard, fall   Orthopedic Precautions:LLE weight bearing as tolerated   Braces: N/A     Functional Mobility:  · Sit <> stand bedside chair with rolling walker with Supervision   · Patient gait trained 300 feet with rolling walker with Supervision. Patient demo decrease left knee flexion and required constant verbal cues for heel strike/toe off. Reciprocal gait pattern.      Therapeutic Activities and Exercises:  Patient performed therex X 15 reps LLE per TKA protocol AROM/AAROM   Patient ascend/descend 2" curb step with RW with Supervision   Patient was given HEP booklet     Patient left up in chair with all lines intact, call button in reach and nurse notified..    GOALS:    Physical Therapy Goals        Problem: Physical Therapy Goal    Goal Priority Disciplines Outcome Goal Variances Interventions   Physical Therapy Goal     PT/OT, PT Ongoing (interventions implemented as appropriate)     Description:  Goals to be met by: 17     Patient will increase functional independence with mobility by performin. Supine to sit with supervision.   2. Sit to supine with supervision.   3. Sit<>stand transfer with supervision using rolling walker.  MET 17  4. Gait > 150 feet with SBA using rolling walker.  MET 17  5. Ascend/descend curb step with CGA with rolling walker. MET 17                       Time Tracking:     PT Received On: 17  PT Start Time: 926     PT Stop Time: 1000  PT Total Time (min): 34 min     Billable Minutes: Gait Training 10 and Therapeutic Exercise 14    Treatment Type: Treatment  PT/PTA: PTA     PTA Visit Number: 1     Yvonne Llanos, PTA  2017  "

## 2017-12-27 NOTE — PLAN OF CARE
Problem: Physical Therapy Goal  Goal: Physical Therapy Goal  Goals to be met by: 17     Patient will increase functional independence with mobility by performin. Supine to sit with supervision.   2. Sit to supine with supervision.   3. Sit<>stand transfer with supervision using rolling walker.  MET 17  4. Gait > 150 feet with SBA using rolling walker.  MET 17  5. Ascend/descend curb step with CGA with rolling walker. MET 17     Outcome: Ongoing (interventions implemented as appropriate)    Progressing well towards goals

## 2017-12-27 NOTE — DISCHARGE INSTRUCTIONS
Knee Athroscopy Discharge Instructions    1) Pain: After surgery your knee will be sore. The knee will likely have been injected with a numbing medicine (Exparel) prior to completion of surgery for pain control. This is indicated on a green bracelet that you will continue to wear for 4 days after surgery. You will   also get a prescription for pain control before you leave the hospital. Ice and elevation will assist with pain control.    a) Apply ice as much as possible for the first 72 hours. After 72 hours, apply ice for 20minutes after therapy,after exercising or whenever experiencing pain. Avoid direct skin contact with ice to prevent frostbit.          b) Elevate the affected leg with the pillow the length of the leg higher than your heart to assist with swelling and pain.  2) Incision Care:  a) Some drainage from the incision in the first 72 hours is normal. If drainage is excessive,remove bandage, clean with mild soap and water, pat dry, cover with sterile gauze and secure with tape. Notify physician about excessive drainage. Staples will be removed 14-21 days after surgery   3) Activity:  a) Perform exercises 2-3 x day.  b) You may shower 48 hours after surgery providing the dressing is waterproof. No tub or hot tub usage. Support help is mandatory during showering. If the dressing becomes wet, replace with a new dressing.   c) Wear thigh high arley hose stockings for 3 weeks after surgery .You may remove stockings for 1- 2 hours during the day only.  d) If your physician orders the CPM machine you are to use it for 2 hours in the am and 2 hours in the pm.Increase the flexion 5 degrees each session if tolerated. This is not to replace your exercise program.  4) For lifetime after your replacement surgery, you may need antibiotic coverage before dental or minor surgical procedures.  5) Possible Complications: Call Surgeon  a) Infection: Report these signs and symptoms to your surgeon.  i) Unexpected redness  around incision   ii) Persistent drainage from wound after 72 hours.  iii) Temperature ,can be treated with Tylenol. Do not go to the emergency room or urgent care center, call your surgeon.   iv) Additional swelling  v) Pain not controlled with current pain medication  b) Blood Clot: Report theses signs and symptoms to your surgeon  i) Unusual pain  ii) Red or discolored skin  iii) Swelling in the leg  iv) Unusual warm skin

## 2017-12-27 NOTE — PLAN OF CARE
Rolling walker delivered to patient's room      12/27/17 0858   Final Note   Assessment Type Final Discharge Note   Discharge Disposition Home-Health   What phone number can be called within the next 1-3 days to see how you are doing after discharge? [phone]   Discharge plans and expectations educations in teach back method with documentation complete? Yes   Right Care Referral Info   Post Acute Recommendation Home-care   Facility Name [facility]

## 2017-12-27 NOTE — PLAN OF CARE
Problem: Occupational Therapy Goal  Goal: Occupational Therapy Goal  No goals  Outcome: Outcome(s) achieved Date Met: 12/27/17  OT evaluation completed with treatment provided.  Recommend Home Health PT/OT at discharge.  DME;RW.  RICKI Castrejon 12/27/2017

## 2017-12-27 NOTE — PLAN OF CARE
Problem: Patient Care Overview  Goal: Plan of Care Review  Outcome: Ongoing (interventions implemented as appropriate)  Patient in no apparent distress. Sat's  95 % on room air. IS done. Will continue to monitor.

## 2017-12-27 NOTE — PROGRESS NOTES
"Internal Medicine  Progress Note    Admit Date: 12/26/2017   LOS: 1 day     SUBJECTIVE:     Follow-up For:  Primary localized osteoarthrosis of the knee, left    Interval History:     POD #1 left knee repair. Seen up in chair. Denies CP,SOB,F,C or calf tenderness. Urinating without difficulty, tolerating PO. Will discharge today.     Review of Systems:  Constitutional: No fever or chills  Respiratory: No cough or shortness of breath  Cardiovascular: No chest pain or palpitations  Gastrointestinal: No nausea or vomiting  Neurological: No confusion or weakness    OBJECTIVE:     Vital Signs Range (Last 24H):  BP (!) 145/77 (BP Location: Left arm, Patient Position: Lying)   Pulse 78   Temp 98.1 °F (36.7 °C) (Oral)   Resp 18   Ht 5' 8" (1.727 m)   Wt 87.1 kg (192 lb)   SpO2 98%   BMI 29.19 kg/m²     Temp:  [97.2 °F (36.2 °C)-98.1 °F (36.7 °C)]   Pulse:  [64-87]   Resp:  [18-20]   BP: (109-172)/(57-87)   SpO2:  [94 %-100 %]     I & O (Last 24H):  Intake/Output Summary (Last 24 hours) at 12/27/17 1114  Last data filed at 12/27/17 1000   Gross per 24 hour   Intake              990 ml   Output             3600 ml   Net            -2610 ml       Physical Exam:  General appearance: Well developed, well nourished  Eyes:  Conjunctivae/corneas clear. PERRL.  Lungs: Normal respiratory effort,   clear to auscultation bilaterally   Heart: Regular rate and rhythm, S1, S2 normal, no murmur, rub or emi.  Abdomen: Soft, non-tender non-distended; bowel sounds normal; no masses,  no organomegaly  Extremities: No cyanosis or clubbing. No edema.   ACE wrap to left  Knee CDI.  Skin: Skin color, texture, turgor normal. No rashes or lesions  Neurologic: Normal strength and tone. No focal numbness or weakness     Laboratory Data:    Recent Labs  Lab 12/27/17  0420   WBC 5.62   RBC 3.89*   HGB 12.3*   HCT 36.5*      MCV 94   MCH 31.6*   MCHC 33.7       BMP:   Recent Labs  Lab 12/20/17  1149   GLU 89      K 4.7    " "  CO2 27   BUN 18   CREATININE 0.9   CALCIUM 9.8     Lab Results   Component Value Date    CALCIUM 9.8 12/20/2017               Medications:  Medication list was reviewed and changes noted under Assessment/Plan.    Diagnostic Results:    Reviewed      ASSESSMENT/PLAN:     1. Left knee repair (M17.12): POD #1, per ortho and therapy teams  2. Insomnia (G47.00): secondary to chronic pain.    3. Chronic pain (G89.29) secondary to cervical region somatic dysfunction (M99.01): managed by Dr. Alvarenga. Patient reports his last cervical fusion "failed".  Will defer to Lyle.  4. DVT prophy: Will d/c with ASA 81 mg BID, and TEDs per ortho  5. PONV: anti-emetics used prn.  Improved today.  6. Post-operative anemia (D64.9):   Patient asymptomatic, dilutional vs post-op loss.     Plan: Medically stable for d/c.                    "

## 2017-12-27 NOTE — OP NOTE
DATE OF PROCEDURE:  12/26/2017.    SURGEON:  Ignacio Saeed M.D.    PREOPERATIVE DIAGNOSIS:  Left knee tricompartmental osteoarthritis, status post   ACL reconstruction.    POSTOPERATIVE DIAGNOSIS:  Left knee tricompartmental osteoarthritis, status post   ACL reconstruction.    PROCEDURES:  Left total knee arthroplasty with LIMA components.    ANESTHESIA:  Spinal.    COMPLICATIONS:  None.    BLOOD LOSS:  None.    DRAINS:  None.    INDICATIONS:  The patient is a 57-year-old male with a history of left knee   osteoarthritis status post left knee ACL reconstruction in the past, developed   significant bone-on-bone osteoarthritis in the weightbearing compartments with   tricompartmental changes.  He had failed conservative modalities.  It was felt   that a total knee arthroplasty would be indicated.  He understood the options of   treatment with the risks, benefits, limitations of operative versus   nonoperative treatment and gave informed consent for operative intervention.    FINDINGS:  Surgical findings showed significant tricompartmental osteoarthritis   mainly involving the medial compartment and patellofemoral joint region.  After   placement of the prosthetic components, we used a cemented 6 femoral component   along with a cemented 6 tibial component and an 11 mm polyethylene insert.  We   used a 29 all-poly patella, which was cemented into place.  After adequate set   of time for the cement, the knee had full range of motion, excellent alignment   and stability in both AP and lateral planes, excellent patellar tracking.  No   other pathology noted.    PROCEDURE IN DETAIL:  After operative consents were obtained, the patient was   brought to the Operating Room, laid in a supine position.  After spinal   anesthesia was administered via the Anesthesia Department, the patient was   placed on a well-padded operating table.  All bony prominences were well-padded.    A tourniquet was placed high on the left thigh and  a post was placed on the   operating table to allow left knee flexion during the procedure.  The patient   did receive preoperative antibiotics prior to tourniquet elevation.  A   tourniquet was placed high on the left thigh and then the left knee and leg were   prepped and draped in the usual sterile fashion.  After exsanguination of the   limb, the tourniquet was inflated to 300 mmHg.  Standard mini approach midline   incision was made over the left knee.  Dissection was carried down to the   extensor mechanism.  A medial parapatellar arthrotomy was then made.  The   retropatellar tendon fat pad was excised as well as the medial and lateral   meniscal cartilages.  Gaining access into the intramedullary canal of the femur,   the distal femoral alignment jig was applied with a 3 degree valgus cut   reference.  The distal femoral cut was then made.  Referencing off the distal   femur, the sizing jig was applied to accept a 6 cutting block.  The cutting   block was applied and the anterior posterior femoral cuts were made followed by   the anterior posterior chamfer cuts.  Proximal tibia was then exposed and using   the extramedullary tibial alignment guide in the appropriate varus, valgus and   posterior slope, proximal tibia was resected salvaging the PCL.  We sized the   tibia to accept a 6 cruciform stem and a cruciform stem was cut in the   appropriate rotation.  A trial reduction was then carried out with the 6 femur,   6 tibia, 10 and 11 spacer.  The 11 was most stable.  There was excellent   alignment and stability in both AP and lateral planes.  The patella was then   resurfaced removing 9 mm of bone and cartilage and a 3 prong 29 patella was   drilled followed by the trial recreating the thickness of the patella.  The   patella tracked well.  There was no need for a lateral release.  All trial   components were then removed and the bone edges were irrigated with pulse jet   lavage sterile saline with  antibiotic solution.  The "[employer] tissue ablation   instrument was also used for tissue hemostasis at that time.  The permanent   components were opened on the back table.  Two bags of Palacos cement were mixed   and then applied to the tibia and the 6 tibial component was cemented into   place, followed by the cemented 6 femoral component.  The 11 mm polyethylene   insert was placed along with a locking mechanism.  The knee was then brought to   full extension further compressing the cement.  The 29 all-poly patella was   cemented into place, held with a patellar clamp.  After adequate set of time for   the cement, the knee had full range of motion, excellent alignment and   stability in both AP and lateral planes, excellent patellar tracking.  The knee   was again irrigated with pulse jet lavage sterile saline with antibiotic   solution.  The Exparel cocktail was then injected into the capsule and   subcutaneous tissue for local anesthesia and then 3 vials of tranexamic acid   were drizzled on to the capsule and soft tissue for tissue hemostasis.  The   medial arthrotomy was then closed with interrupted Ethibond suture at the VMO x3   along with a running #2 Quill suture.  Subcutaneous was closed in layers with 0   and 2-0 Vicryl suture and the skin closed with metal staples.  Sterile   dressings were applied consisting of Xeroform gauze, 4 x 4s and cast padding   along with a Breg Polar Care unit and an Ace bandage from toe to groin.    Tourniquet deflated after a little over an hour tourniquet time.  The patient   tolerated the procedure well, was sent to Recovery Room in stable condition.    Needle and sponge counts were correct.  Blood loss was minimal.  No blood given.    No drains placed.      TPF/HN  dd: 12/26/2017 11:50:28 (CST)  td: 12/26/2017 19:17:51 (CST)  Doc ID   #[id number]  Job ID #[id number]    CC:

## 2017-12-27 NOTE — PT/OT/SLP DISCHARGE
Physical Therapy Discharge Summary    Name: Cesar Lomeli  MRN: [id number]   Principal Problem: Primary localized osteoarthrosis of the knee, left     Patient Discharged from acute Physical Therapy on 17.  Please refer to prior PT noted date on 17 for functional status.     Assessment:     Goals partially met.    Objective:     GOALS:    Physical Therapy Goals        Problem: Physical Therapy Goal    Goal Priority Disciplines Outcome Goal Variances Interventions   Physical Therapy Goal     PT/OT, PT Ongoing (interventions implemented as appropriate)     Description:  Goals to be met by: 17     Patient will increase functional independence with mobility by performin. Supine to sit with supervision.   2. Sit to supine with supervision.   3. Sit<>stand transfer with supervision using rolling walker.  MET 17  4. Gait > 150 feet with SBA using rolling walker.  MET 17  5. Ascend/descend curb step with CGA with rolling walker. MET 17                       Reasons for Discontinuation of Therapy Services  Transfer to alternate level of care.      Plan:     Patient Discharged to: Home with Home Health Service.    Wendy Samayoa, PT  2017

## 2018-01-02 NOTE — DISCHARGE SUMMARY
DATE OF ADMISSION:  12/26/2017.    DATE OF DISCHARGE:  12/27/2017.    DIAGNOSIS:  Left knee tricompartmental osteoarthritis.    PROCEDURE:  On 12/26/2017, left total knee arthroplasty.    HISTORY:  The patient is a 57-year-old male with a history of left knee pain.    He had significant osteoarthritis with bone on bone.  He was status post ACL   reconstruction in the past.  He was admitted for total knee arthroplasty.    HOSPITAL COURSE:  The patient underwent left total knee arthroplasty on the   above noted date.  Postoperatively, he did well.  He had his dressings changed   on postoperative day #1.  He progressed in physical therapy with gait training   and range of motion.  He was discharged home on postoperative day #1 in good   condition to have a regular diet.  Med reconciliation form completed.  He will   follow up Dr. Saeed in 2 weeks.  Home health arranged for gait training and   range of motion to the left knee.      LIZETTE/ANNE  dd: 01/02/2018 07:27:22 (CST)  td: 01/02/2018 07:45:50 (CST)  Doc ID   #[id number]  Job ID #[id number]    CC:

## 2018-01-03 NOTE — PHYSICIAN QUERY
"This is already documented please see the not from Rachel painting which I attested......(per Dr. Rosales)    PT Name: Cesar Lomeli  MR #: [id number]    Physician Query Form - Hematology Clarification      CDS: Magdalena Carrington RN, CCDS       Contact information: [email or web address]    This form is a permanent document in the medical record.      Query Date: January 3, 2018    By submitting this query, we are merely seeking further clarification of documentation. Please utilize your independent clinical judgment when addressing the question(s) below.    The Medical record contains the following:   Indicators  Supporting Clinical Findings Location in Medical Record   X "Anemia" documented 1. Post-operative anemia (D64.9):  Patient asymptomatic, dilutional vs post-op loss. 12/27-Int Med PN   X H & H = Hgb=12.3, Hct=36.5 12/27-Labs    BP =                     HR=      "GI bleeding" documented      Acute bleeding (Non GI site)      Transfusion(s)      Treatment:     X Other:  BLOOD LOSS:  None. 12/26-Op Note     Provider, please specify diagnosis or diagnoses associated with above clinical findings.    [  ] Acute blood loss anemia expected post-operatively  [  ] Precipitous drop in Hematocrit  [  ] Other Hematological Diagnosis (please specify): _________________________________  [  ] Clinically Undetermined       Please document in your progress notes daily for the duration of treatment, until resolved, and include in your discharge summary.                                                                                                      "

## 2018-01-18 PROBLEM — M25.562 PAIN IN JOINT OF LEFT KNEE: Status: ACTIVE | Noted: 2018-01-18

## 2018-01-18 PROBLEM — R26.2 DIFFICULTY WALKING: Status: ACTIVE | Noted: 2018-01-18

## 2018-01-18 PROBLEM — M25.662 DECREASED ROM OF LEFT KNEE: Status: ACTIVE | Noted: 2018-01-18

## 2018-01-18 PROBLEM — M17.12 ARTHROPATHY OF LEFT KNEE: Status: ACTIVE | Noted: 2018-01-18

## 2018-01-18 PROBLEM — R60.0 EDEMA OF RIGHT LOWER EXTREMITY: Status: ACTIVE | Noted: 2018-01-18

## 2018-01-18 PROBLEM — R26.89 OTHER ABNORMALITIES OF GAIT AND MOBILITY: Status: ACTIVE | Noted: 2018-01-18

## (undated) DEVICE — KIT IRR SUCTION HND PIECE

## (undated) DEVICE — Device

## (undated) DEVICE — SOL 9P NACL IRR PIC IL

## (undated) DEVICE — DRESSING GAUZE 6PLY 4X4

## (undated) DEVICE — PAD ABD 8X10 STERILE

## (undated) DEVICE — SEALER BIPOLAR TISSUE 6.0

## (undated) DEVICE — BLADE SAGITTAL 18 X 1.27 X 90M

## (undated) DEVICE — COVER BACK TABLE 72X21

## (undated) DEVICE — GAUZE SPONGE 4X4 12PLY

## (undated) DEVICE — DRAPE STERI INSTRUMENT 1018

## (undated) DEVICE — SUT VICRYL PLUS 0 CT1 36IN

## (undated) DEVICE — SEE MEDLINE ITEM 146231

## (undated) DEVICE — SOL NACL 0.9% INJ 250ML BG

## (undated) DEVICE — SUT VICRYL PLUS 2-0 CT1 18

## (undated) DEVICE — SYR 30CC LUER LOCK

## (undated) DEVICE — GLOVE BIOGEL SKINSENSE PI 7.0

## (undated) DEVICE — BANDAGE ACE ELASTIC 6"

## (undated) DEVICE — UNDERGLOVES BIOGEL PI SIZE 8

## (undated) DEVICE — SEE MEDLINE ITEM 146345

## (undated) DEVICE — ELECTRODE REM PLYHSV RETURN 9

## (undated) DEVICE — NDL HYPO SAFETY 22 X 1 1/2

## (undated) DEVICE — PAD CAST SPECIALIST STRL 6

## (undated) DEVICE — UNDERGLOVES BIOGEL PI SIZE 7.5

## (undated) DEVICE — SUT ETHIBOND 0 CR/CT-1 8-18

## (undated) DEVICE — SEE MEDLINE ITEM 153151

## (undated) DEVICE — BLADE SURG CARBON STEEL #10

## (undated) DEVICE — DRESSING XEROFORM GAUZE 5X9

## (undated) DEVICE — SEE MEDLINE ITEM 152523

## (undated) DEVICE — STAPLER SKIN PROXIMATE WIDE

## (undated) DEVICE — SAGITTAL BLADE 24.46MM

## (undated) DEVICE — TOURNIQUET SB QC DP 34X4IN

## (undated) DEVICE — PAD KNEE POLAR XL

## (undated) DEVICE — GLOVE BIOGEL SKINSENSE PI 8.0

## (undated) DEVICE — APPLICATOR CHLORAPREP ORN 26ML

## (undated) DEVICE — SEE MEDLINE ITEM 152548

## (undated) DEVICE — SUT STRATAFIX PDS 1 CTX 18IN

## (undated) DEVICE — SOL IRR NACL .9% 3000ML

## (undated) DEVICE — SEE MEDLINE ITEM 146298

## (undated) DEVICE — SHIELD FACE HALF DISP 50/BX